# Patient Record
Sex: MALE | Race: BLACK OR AFRICAN AMERICAN
[De-identification: names, ages, dates, MRNs, and addresses within clinical notes are randomized per-mention and may not be internally consistent; named-entity substitution may affect disease eponyms.]

---

## 2019-12-17 ENCOUNTER — HOSPITAL ENCOUNTER (OUTPATIENT)
Dept: HOSPITAL 92 - BICRAD | Age: 79
Discharge: HOME | End: 2019-12-17
Attending: INTERNAL MEDICINE
Payer: MEDICARE

## 2019-12-17 DIAGNOSIS — M54.9: Primary | ICD-10-CM

## 2019-12-17 DIAGNOSIS — M41.9: ICD-10-CM

## 2019-12-17 DIAGNOSIS — M17.11: ICD-10-CM

## 2019-12-17 DIAGNOSIS — M47.816: ICD-10-CM

## 2019-12-17 DIAGNOSIS — M43.16: ICD-10-CM

## 2019-12-17 PROCEDURE — 72100 X-RAY EXAM L-S SPINE 2/3 VWS: CPT

## 2019-12-17 NOTE — RAD
RIGHT KNEE 4 VIEWS:

 

Date:  12/17/19 

 

HISTORY:  

Knee pain x1 month. 

 

FINDINGS:

There are marked arthritic changes of the knee. There is severe joint space narrowing of the patellof
emoral joint space. There is spurring of the medial and lateral compartments. No acute bony changes. 


 

IMPRESSION: 

Marked arthritic changes of the knee. Changes are particularly pronounced at the patellofemoral joint
 level. 

 

 

POS: [facility]

## 2019-12-17 NOTE — RAD
LUMBAR SPINE 2 VIEWS:

 

Date:  12/17/19 

 

HISTORY:  

Back pain. 

 

COMPARISON:  

02/03/05. 

 

FINDINGS:

Mild levoscoliosis. Minimal Grade I anterolisthesis of L4 on L5 with some disc space narrowing. Multi
level disc osteophytes. 

 

IMPRESSION: 

Mild levoscoliosis with Grade I anterolisthesis of L4 on L5 with some associated narrowing. Generaliz
ed spondylosis. 

 

 

 

POS: [facility]